# Patient Record
Sex: FEMALE | Race: WHITE | URBAN - METROPOLITAN AREA
[De-identification: names, ages, dates, MRNs, and addresses within clinical notes are randomized per-mention and may not be internally consistent; named-entity substitution may affect disease eponyms.]

---
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
## 2022-11-02 ENCOUNTER — OFFICE VISIT (OUTPATIENT)
Dept: NEUROLOGY | Age: 22
End: 2022-11-02
Payer: COMMERCIAL

## 2022-11-02 VITALS
HEART RATE: 82 BPM | OXYGEN SATURATION: 99 % | BODY MASS INDEX: 25.92 KG/M2 | DIASTOLIC BLOOD PRESSURE: 70 MMHG | SYSTOLIC BLOOD PRESSURE: 120 MMHG | HEIGHT: 64 IN

## 2022-11-02 DIAGNOSIS — G43.009 MIGRAINE WITHOUT AURA AND WITHOUT STATUS MIGRAINOSUS, NOT INTRACTABLE: Primary | ICD-10-CM

## 2022-11-02 PROCEDURE — 99213 OFFICE O/P EST LOW 20 MIN: CPT | Performed by: SPECIALIST

## 2022-11-02 RX ORDER — ELETRIPTAN HYDROBROMIDE 40 MG/1
TABLET, FILM COATED ORAL
Qty: 9 TABLET | Refills: 3 | Status: SHIPPED | OUTPATIENT
Start: 2022-11-02

## 2022-11-02 RX ORDER — PROPRANOLOL HYDROCHLORIDE 120 MG/1
120 CAPSULE, EXTENDED RELEASE ORAL DAILY
Qty: 30 CAPSULE | Refills: 3 | Status: SHIPPED | OUTPATIENT
Start: 2022-11-02

## 2022-11-02 NOTE — LETTER
11/2/2022    Patient: Dada Barcenas   YOB: 2000   Date of Visit: 11/2/2022     Kevin Monk MD  [facility] [id number]  Via Fax: [phone]    Dear Kevin Monk MD,      Thank you for referring Ms. Dada Barcenas to [facility] for evaluation. My notes for this consultation are attached. If you have questions, please do not hesitate to call me. I look forward to following your patient along with you.       Sincerely,    Marry Fermin MD

## 2022-11-02 NOTE — PROGRESS NOTES
Neurology Consult      Subjective:      Vianca Hernandez is a 23 y.o. female who comes in today on migraine headache follow-up. Unfortunately the Maxalt is not rescuing her headaches consistently or successfully otherwise. We will replace that with the drug Relpax at 40 mg. If that does not work we might be able to look at a newer agent such as Nurtec or Ubrelvy and consider a different product in place of the propranolol which has been increased to 120 mg as a new working dose. Has been asked to give me a heads up in 10 days to 2 weeks if the propranolol is not performing. At that point the possibilities include Topamax or even one of the CGRP preventatives such as injectables and in that same venue Nurtec could be a preventative agent as well. 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 Revisit 3 months. Current Outpatient Medications   Medication Sig Dispense Refill    propranolol LA (INDERAL LA) 120 mg SR capsule Take 1 Capsule by mouth daily. Indications: migraine prevention 30 Capsule 3    eletriptan (Relpax) 40 mg tablet 1 p.o. daily as needed and may repeat in 2 hours if necessary 9 Tablet 3    Cetirizine (ZyrTEC) 10 mg cap Take  by mouth.      levonorgestrel/ethin.estradiol (KURVELO, 28, PO) Take  by mouth. butalb/acetaminophen/caffeine (FIORICET PO) Take  by mouth. (Patient not taking: No sig reported)        No Known Allergies  Past Medical History:   Diagnosis Date    Anxiety     Headache     Heavy breathing       No past surgical history on file.    Social History     Socioeconomic History    Marital status:      Spouse name: Not on file    Number of children: Not on file    Years of education: Not on file    Highest education level: Not on file   Occupational History    Not on file   Tobacco Use    Smoking status: Never    Smokeless tobacco: Never   Substance and Sexual Activity    Alcohol use: Never    Drug use: Not on file Sexual activity: Not on file   Other Topics Concern    Not on file   Social History Narrative    Not on file     Social Determinants of Health     Financial Resource Strain: Not on file   Food Insecurity: Not on file   Transportation Needs: Not on file   Physical Activity: Not on file   Stress: Not on file   Social Connections: Not on file   Intimate Partner Violence: Not on file   Housing Stability: Not on file      Family History   Problem Relation Age of Onset    Cancer Mother       Visit Vitals  /70   Pulse 82   Ht 5' 4\" (1.626 m)   SpO2 99%   BMI 25.92 kg/m²        Review of Systems:   A comprehensive review of systems was negative except for that written in the HPI. Neuro Exam:     Appearance: The patient is well developed, well nourished, provides a coherent history and is in no acute distress. Mental Status: Oriented to time, place and person. Mood and affect appropriate. Cranial Nerves:   Intact visual fields. Fundi are benign. CHAR, EOM's full, no nystagmus, no ptosis. Facial sensation is normal. Corneal reflexes are intact. Facial movement is symmetric. Hearing is normal bilaterally. Palate is midline with normal sternocleidomastoid and trapezius muscles are normal. Tongue is midline. Motor:  5/5 strength in upper and lower proximal and distal muscles. Normal bulk and tone. No fasciculations. Reflexes:   Deep tendon reflexes 2+/4 and symmetrical.   Sensory:   Normal to touch, pinprick and vibration. Gait:  Normal gait. Tremor:   No tremor noted. Cerebellar:  No cerebellar signs present. Neurovascular:  Normal heart sounds and regular rhythm, peripheral pulses intact, and no carotid bruits. Assessment:   Migraine headaches. We will see if we can move to a more successful venture here by switching the Maxalt to Relpax and increasing the inderal LA from 80 to 120 mg.   On the occasion that the inderal at a higher dosage does not work we could start looking at newer product such as Nurtec and or CGRP injectables. Plan:   Revisit 3 months and if weather is inclement by all means reschedule.   Signed by :  Julio Cesar Galvez MD

## 2022-11-02 NOTE — PATIENT INSTRUCTIONS
Headache history review and will make the following suggestions based on most recent headache activity. Will increase the Inderal LA from 80 to 120 mg and in 10 days or 2 weeks if no improvement please let us know and we should pursue a different preventative. We will switch out the Maxalt or rizatriptan for a different drug in class call Relpax. Hopefully it does a better job of rescue and was reminded it can be used at moment 0 with an over-the-counter remedy such as Motrin Tylenol naproxen for potentially better combination effect. Revisit in 3 months. If the weather is inclement by all means reschedule. Good luck. Have great holidays.

## 2023-05-23 RX ORDER — CETIRIZINE HYDROCHLORIDE 10 MG/1
CAPSULE, LIQUID FILLED ORAL
COMMUNITY

## 2023-05-23 RX ORDER — ELETRIPTAN HYDROBROMIDE 40 MG/1
TABLET, FILM COATED ORAL
COMMUNITY
Start: 2022-11-02